# Patient Record
Sex: FEMALE | Race: WHITE
[De-identification: names, ages, dates, MRNs, and addresses within clinical notes are randomized per-mention and may not be internally consistent; named-entity substitution may affect disease eponyms.]

---

## 2017-10-04 ENCOUNTER — HOSPITAL ENCOUNTER (INPATIENT)
Dept: HOSPITAL 36 - ER | Age: 62
LOS: 3 days | Discharge: HOME | DRG: 377 | End: 2017-10-07
Payer: MEDICARE

## 2017-10-04 VITALS — DIASTOLIC BLOOD PRESSURE: 66 MMHG | SYSTOLIC BLOOD PRESSURE: 120 MMHG

## 2017-10-04 DIAGNOSIS — Z83.3: ICD-10-CM

## 2017-10-04 DIAGNOSIS — E83.51: ICD-10-CM

## 2017-10-04 DIAGNOSIS — I10: ICD-10-CM

## 2017-10-04 DIAGNOSIS — G93.40: ICD-10-CM

## 2017-10-04 DIAGNOSIS — K21.9: ICD-10-CM

## 2017-10-04 DIAGNOSIS — K62.1: ICD-10-CM

## 2017-10-04 DIAGNOSIS — E87.6: ICD-10-CM

## 2017-10-04 DIAGNOSIS — E86.0: ICD-10-CM

## 2017-10-04 DIAGNOSIS — K56.41: ICD-10-CM

## 2017-10-04 DIAGNOSIS — R13.10: ICD-10-CM

## 2017-10-04 DIAGNOSIS — K64.8: ICD-10-CM

## 2017-10-04 DIAGNOSIS — K27.9: ICD-10-CM

## 2017-10-04 DIAGNOSIS — Z86.73: ICD-10-CM

## 2017-10-04 DIAGNOSIS — F03.90: ICD-10-CM

## 2017-10-04 DIAGNOSIS — K62.5: Primary | ICD-10-CM

## 2017-10-04 DIAGNOSIS — R73.9: ICD-10-CM

## 2017-10-04 DIAGNOSIS — Z93.1: ICD-10-CM

## 2017-10-04 DIAGNOSIS — E87.1: ICD-10-CM

## 2017-10-04 DIAGNOSIS — K62.6: ICD-10-CM

## 2017-10-04 DIAGNOSIS — E78.5: ICD-10-CM

## 2017-10-04 DIAGNOSIS — Z82.49: ICD-10-CM

## 2017-10-04 DIAGNOSIS — I25.9: ICD-10-CM

## 2017-10-04 LAB
ALBUMIN/GLOB SERPL: 1.1 {RATIO} (ref 1–1.8)
ALP SERPL-CCNC: 96 U/L (ref 34–104)
ALT SERPL-CCNC: 18 U/L (ref 7–52)
AMYLASE SERPL-CCNC: 33 U/L (ref 29–103)
ANION GAP SERPL CALC-SCNC: 11.5 MMOL/L (ref 7–16)
AST SERPL-CCNC: 21 U/L (ref 13–39)
BASOPHILS NFR BLD AUTO: 0.5 % (ref 0–2)
BILIRUB SERPL-MCNC: 0.4 MG/DL (ref 0.3–1)
BUN SERPL-MCNC: 23 MG/DL (ref 7–25)
BUN/CREAT SERPL: 57.5
CALCIUM SERPL-MCNC: 9.8 MG/DL (ref 8.6–10.3)
CHLORIDE SERPL-SCNC: 104 MEQ/L (ref 98–107)
CHOLEST SERPL-MCNC: 173 MG/DL (ref ?–200)
CO2 SERPL-SCNC: 21.6 MEQ/L (ref 21–31)
CREAT SERPL-MCNC: 0.4 MG/DL (ref 0.6–1.2)
EOSINOPHIL NFR BLD AUTO: 3.2 % (ref 0–5)
ERYTHROCYTE [DISTWIDTH] IN BLOOD BY AUTOMATED COUNT: 12.7 % (ref 11.5–20)
GLOBULIN SER-MCNC: 3.7 GM/DL
GLUCOSE SERPL-MCNC: 123 MG/DL (ref 70–105)
HCT VFR BLD CALC: 43 % (ref 41–60)
HGB BLD-MCNC: 14.5 GM/DL (ref 12–16)
INR PPP: 1.06 (ref 0.5–1.4)
LIPASE SERPL-CCNC: 25 U/L (ref 11–82)
LYMPHOCYTES NFR BLD AUTO: 24.3 % (ref 20–50)
MCH RBC QN AUTO: 29.8 PG (ref 27–31)
MCHC RBC AUTO-ENTMCNC: 33.8 PG (ref 28–36)
MCV RBC AUTO: 88.4 FL (ref 81–100)
MONOCYTES NFR BLD AUTO: 7 % (ref 2–10)
NEUTROPHILS # BLD: 4.8 TH/CMM (ref 1.8–8)
NEUTROPHILS NFR BLD AUTO: 65 % (ref 40–80)
PLATELET # BLD: 355 TH/CMM (ref 150–400)
PMV BLD AUTO: 7.8 FL
POTASSIUM SERPL-SCNC: 4.1 MEQ/L (ref 3.5–5.1)
PROTHROMBIN TIME: 11 SECONDS (ref 9.5–11.5)
RBC # BLD AUTO: 4.87 MIL/CMM (ref 3.8–5.1)
SODIUM SERPL-SCNC: 133 MEQ/L (ref 136–145)
TRIGL SERPL-MCNC: 206 MG/DL (ref ?–150)
WBC # BLD AUTO: 7.3 TH/CMM (ref 4.8–10.8)

## 2017-10-04 PROCEDURE — X6024: HCPCS

## 2017-10-04 PROCEDURE — Z7610: HCPCS

## 2017-10-04 PROCEDURE — Z7506: HCPCS

## 2017-10-04 PROCEDURE — C9113 INJ PANTOPRAZOLE SODIUM, VIA: HCPCS

## 2017-10-04 PROCEDURE — X6206: HCPCS

## 2017-10-04 RX ADMIN — LACTULOSE SCH GM: 20 SOLUTION ORAL at 20:36

## 2017-10-04 RX ADMIN — POLYVINYL ALCOHOL SCH DROP: 14 SOLUTION/ DROPS OPHTHALMIC at 20:36

## 2017-10-04 NOTE — DIAGNOSTIC IMAGING REPORT
Portable chest x-ray



HISTORY: Pain



There is a very poor inspiration. The heart is enlarged. No acute focal

pulmonary processes. No evidence of pleural fluid.



IMPRESSION:

1. Cardiomegaly

2. Allowing for a poor inspiration, no acute focal pulmonary processes.

## 2017-10-04 NOTE — ED PHYSICIAN CHART
ED Chief Complaint/HPI





- Patient Information


Date Seen:: 10/04/17


Time Seen:: 11:15


Chief Complaint:: Hematochezia


History of Present Illness:: 





onset x one day of hematochezia; no report of hematemesis, melena, Abd. pain, 

flank pain, A/N/V/D/C, fever, chills, C/P, SOB, cough, neck pain, H/As, weakness

, or dizziness


Allergies:: 


 Allergies











Allergy/AdvReac Type Severity Reaction Status Date / Time


 


No Known Allergies Allergy   Verified 10/04/17 11:17











Vitals:: 


 Vital Signs - 8 hr











  10/04/17





  11:17


 


Temp 97.7 F


 


HR 70


 


RR 14


 


/69


 


O2 Sat % 98











Historian:: Patient, EMS


Review:: Nurse's Note Reviewed, EMS run form Reviewed





ED Review of Systems





- Review of Systems


General/Constitutional: Fever, Chills, No weight loss, Weakness, No diaphoresis

, No edema, No loss of appetite


Skin: No skin lesions, No rash, No bruising


Head: No headache, No light-headedness


Eyes: No loss of vision, No pain, No diplopia


ENT: No earache, No nasal drainage, No sore throat, No tinnitus


Neck: No neck pain, No swelling, No thyromegaly, No stiffness, No mass noted


Cardio Vascular: No chest pain, No palpitations, No PND, No orthopnea, No edema


Pulmonary: No SOB, No cough, No sputum, No wheezing


GI: Nausea, Vomiting, Diarrhea, No pain, No melena, Hematochezia, No 

constipation, No hematemesis


G/U: No dysuria, No frequency, No hematuria


Ob/Gyn: No vaginal discharge, No abnormal vaginal bleed, No contraction


Musculoskeletal: No bone or joint pain, No back pain, No muscle pain


Endocrine: No polyuria, No polydipsia


Psychiatric: No prior psych history, No depression, No anxiety, No suicidal 

ideation


Hematopoietic: No bruising, No lymphadenopathy


Allergic/Immuno: No urticaria, No angioedema


Neurological: No syncope, Focal symptoms, No weakness, No paresthesia, No 

headache, No seizure, No dizziness, Confusion, No vertigo





ED Past Medical History





- Past Medical History


Obtainable: Yes


Past Medical History: HTN, CVA/TIA, PUD/GERD, Dementia


Family History: Diabetes Melitus, HTN


Social History: Non Smoker, No Alcohol, No Drug Use, Single, Care Facility


Surgical History: PEG/GTube


Psychiatricy History: Dementia


Medication: Reviewed





Family Medical History





- Family Member


  ** Mother


History Unknown: Yes





ED Physical Exam





- Physical Examination


General/Constitutional: Awake, Well-developed, well-nourished, Alert, No 

distress, GCS 15, Non-toxic appearing, Ambulatory


Head: Atraumatic


Eyes: Lids, conjuctiva normal, PERRL, EOMI


Skin: Nl inspection, No rash, No skin lesions, No ecchymosis, Well hydrated, No 

lymphadenopathy


ENMT: External ears, nose nl, Nasal exam nl, Lips, teeth, gums nl


Neck: Nontender, Full ROM w/o pain, No JVD, No nuchal rigidity, No bruit, No 

mass, No stridor


Respiratory: Nl effort/Exclusion, Clear to Auscultation, No Wheeze/Rhonchi/Rales


Cardio Vascular: RRR, No murmur, gallop, rubs, NL S1 S2


GI: No tenderness/rebounding/guarding, No organomegaly, No hernia, Normal BS's, 

Nondistended, No mass/bruits, No McBurney tenderness


: No CVA tenderness


Extremities: No tenderness or effusion, Full ROM, normal strength in all 

extremities, No edema, Normal digits & nails


Neuro/Psych: Alert/oriented, DTR's symmetric, Normal sensory exam, Normal motor 

strength, Judgement/insight normal, Mood normal, Normal gait


Other Neuro/Psych comments:: 





+ old CVA; Disoriented and Confused


Misc: Normal back, No paraspinal tenderness





ED Labs/Radiology/EKG Results





- Lab Results


Comments:: 





Na+: 133





- Radiology Results


Comments:: 





NAD





- EKG Interpretations


EKG Time:: 11:52


Rate & Rhythm: 85; NSR


Comments:: 





T-Wave Inversions





ED Septic Shock





- .


Is Septic Shock (SBP<90, OR Lactate>4 mmol\L) present?: No





- <6hrs of presentation:


Vital Signs: 


 Vital Signs - 8 hr











  10/04/17





  11:17


 


Temp 97.7 F


 


HR 70


 


RR 14


 


/69


 


O2 Sat % 98














ED Reassessment (Disposition)





- Reassessment


Reassessment Condition:: Improved





- Diagnosis


Diagnosis:: 





Dx: Myocardial Ischemia; Hematochezia; GI Bleed; Hyponatremia; Dehydration; 

Rectal Bleeding





- Aftercare/Follow up Instructions


Aftercare/Follow-Up Instructions:: Counseled pt regarding lab results/diagnosis 

& need follow up, Counseled pt & family regarding lab results/diagnosis & need 

follow up





- Patient Disposition


Discharge/Transfer:: Acute Care w/in this hosp


Accepting Physician:: Dr. Kulkarni


Time Called:: 1350


Time Responded:: 13:50


Admitted to:: Telemetry


Spoke to:: Dr. Kulkarni


Admitting Medical Physician:: Dr. Kulkarni


Condition at Disposition:: Stable, Improved

## 2017-10-05 LAB
ANION GAP SERPL CALC-SCNC: 10.2 MMOL/L (ref 7–16)
BASOPHILS NFR BLD AUTO: 0 % (ref 0–2)
BUN SERPL-MCNC: 18 MG/DL (ref 7–25)
BUN/CREAT SERPL: 45
CALCIUM SERPL-MCNC: 8.8 MG/DL (ref 8.6–10.3)
CHLORIDE SERPL-SCNC: 107 MEQ/L (ref 98–107)
CO2 SERPL-SCNC: 22.5 MEQ/L (ref 21–31)
CREAT SERPL-MCNC: 0.4 MG/DL (ref 0.6–1.2)
EOSINOPHIL NFR BLD AUTO: 2.5 % (ref 0–5)
ERYTHROCYTE [DISTWIDTH] IN BLOOD BY AUTOMATED COUNT: 12.7 % (ref 11.5–20)
GLUCOSE SERPL-MCNC: 107 MG/DL (ref 70–105)
HCT VFR BLD CALC: 38 % (ref 41–60)
HGB BLD-MCNC: 12.9 GM/DL (ref 12–16)
LYMPHOCYTES NFR BLD AUTO: 29.3 % (ref 20–50)
MCH RBC QN AUTO: 30 PG (ref 27–31)
MCHC RBC AUTO-ENTMCNC: 33.9 PG (ref 28–36)
MCV RBC AUTO: 88.6 FL (ref 81–100)
MONOCYTES NFR BLD AUTO: 7.8 % (ref 2–10)
NEUTROPHILS # BLD: 3.6 TH/CMM (ref 1.8–8)
NEUTROPHILS NFR BLD AUTO: 60.4 % (ref 40–80)
PLATELET # BLD: 337 TH/CMM (ref 150–400)
PMV BLD AUTO: 8.1 FL
POTASSIUM SERPL-SCNC: 3.7 MEQ/L (ref 3.5–5.1)
RBC # BLD AUTO: 4.29 MIL/CMM (ref 3.8–5.1)
SODIUM SERPL-SCNC: 136 MEQ/L (ref 136–145)
WBC # BLD AUTO: 5.9 TH/CMM (ref 4.8–10.8)

## 2017-10-05 RX ADMIN — POLYVINYL ALCOHOL SCH DROP: 14 SOLUTION/ DROPS OPHTHALMIC at 20:22

## 2017-10-05 RX ADMIN — SODIUM PHOSPHATE, DIBASIC AND SODIUM PHOSPHATE, MONOBASIC PRN ML: 7; 19 ENEMA RECTAL at 03:20

## 2017-10-05 RX ADMIN — POLYVINYL ALCOHOL SCH DROP: 14 SOLUTION/ DROPS OPHTHALMIC at 09:00

## 2017-10-05 RX ADMIN — LACTULOSE SCH GM: 20 SOLUTION ORAL at 20:23

## 2017-10-05 RX ADMIN — POLYVINYL ALCOHOL SCH DROP: 14 SOLUTION/ DROPS OPHTHALMIC at 17:24

## 2017-10-05 RX ADMIN — SODIUM PHOSPHATE, DIBASIC AND SODIUM PHOSPHATE, MONOBASIC PRN ML: 7; 19 ENEMA RECTAL at 03:19

## 2017-10-05 RX ADMIN — LACTULOSE SCH GM: 20 SOLUTION ORAL at 09:00

## 2017-10-05 RX ADMIN — POLYVINYL ALCOHOL SCH DROP: 14 SOLUTION/ DROPS OPHTHALMIC at 14:00

## 2017-10-05 NOTE — HISTORY & PHYSICAL
ADMIT DATE:  10/04/2017



CHIEF COMPLAINT:  Hematochezia.



HISTORY OF PRESENT ILLNESS:  The patient is a 62-year-old female with reports of

1 day of hematochezia, ____ hematemesis, melena, abdominal pain, flank pain or

other complaints.



ALLERGIES:  No known allergies.



REVIEW OF SYSTEMS:  See history of present illness.



PAST MEDICAL HISTORY:  Hypertension, CVA/TIA, peptic ulcer/GERD, dementia.



FAMILY HISTORY:  Diabetes and hypertension.



SOCIAL HISTORY:  No reports of smoking, drinking or drug use.  The patient is a

resident of a skilled nursing facility.



PAST SURGICAL HISTORY:  Gastrostomy tube.



PSYCHIATRIC HISTORY:  Dementia.



MEDICATIONS:  See medication form.



PHYSICAL EXAMINATION:

GENERAL:  The patient is awake, alert, nontoxic in appearance.

VITAL SIGNS:  On admission, temperature 98.7, pulse of 70, blood pressure

140/69, respirations 14, oxygen saturation 90%.

HEENT:  Normocephalic, atraumatic.  Extraocular movements intact.  Pupils

reactive to light.  Oropharynx is clear.

NECK:  Supple.  No thyromegaly.

CARDIOVASCULAR:  S1, S2.  No rubs, gallops.

RESPIRATORY:  Clear.  No wheezes or rhonchi.

GASTROINTESTINAL:  Soft, nontender, nondistended, bowel sounds.

GENITOURINARY:  No CVA tenderness.

BACK:  No midline tenderness.

NEUROLOGIC:  Cranial nerves 2-12 intact.  Sensation intact.  Neurovascular is

intact.  Normal gait.

PSYCHIATRIC:  The patient is slightly confused.



LABORATORY DATA:  Hematology; WBC 7.3, hemoglobin 14.5, hematocrit 43.0,

platelet count 355.  PT 11.0, INR 1.06.  Chemistry:  Sodium 133, potassium 4.1,

chloride 104, bicarbonate 21, anion gap 11, BUN 23, creatinine 0.4, GFR more

than 60, glucose 123, calcium 9.8, total bilirubin 0.4, AST 21, ALT 18, and alk

phos 96, creatinine kinase 42.  Troponin less than 0.01, BNP 7.9, total protein

7.9, albumin 4.2, globulin 3.7, triglycerides 206, cholesterol 173, LDL is 148,

HDL 30, amylase is 33, lipase is 25.



MICROBIOLOGY:  10/04/2017, pending.



RADIOLOGY:  Chest x-ray shows cardiomegaly.  No acute focal pulmonary process. 

Abdomen/pelvis CT shows marked distal fecal impaction with massive distention of

the sigmoid colon and primarily large bowel approximately secondary to patient's

distal fecal impaction, mild distended gallbladder 0.5 mm, hepatic cysts,

appendix not visualized, PEG tube in place, atherosclerotic vascular disease,

chronic moderate compression deformity of L1.  EKG shows normal sinus with

T-wave inversions.



IMPRESSION:

1.  Rectal bleed.

2.  Hematochezia.

3.  Rule out acute coronary syndrome.

4.  Gastrointestinal bleed.

5.  Hyponatremia.

6.  Hyperglycemia.

7.  Hypertriglyceridemia.

8.  Hypertension.

9.  History of cerebrovascular accident/transient ischemic attack.

10.  History of peptic ulcer disease/gastroesophageal reflux disease.

11.  Dementia.

12.  Dysphagia.

13.  Status post gastrostomy tube.



PLAN:  The patient admitted to telemetry unit at [facility]. 

Due for labs as needed.





DD: 10/05/2017 17:57

DT: 10/05/2017 18:47

JOB# [id number]  [id number]

## 2017-10-05 NOTE — CONSULTATION
DATE OF CONSULTATION:  10/05/2017



REQUESTING PHYSICIAN:  Dr. Michele Kulkarni.



REASON FOR CONSULTATION:  Rectal bleeding.



HISTORY OF PRESENT ILLNESS:  This is a 62-year-old female with encephalopathy

and dysphagia status post G-tube insertion, admitted from nursing home for

rectal bleeding.  On imaging here, she was noted to have fecal impaction.  Her

G-tube feedings were restarted.



PAST MEDICAL HISTORY:  As above.  It is unknown whether the patient has had a

previous colonoscopy.



MEDICATIONS:  Here are artificial tears, lactulose, and IV fluids.  Also, the

patient received pantoprazole in the ER and Fleet enema.



ALLERGIES:  None.



SOCIAL HISTORY:  No recent tobacco, alcohol, or drugs.  Nursing home resident.



FAMILY HISTORY:  Noncontributory.



REVIEW OF SYSTEMS:  A comprehensive 12-point review of systems conducted and is

only positive for those signs and symptoms present in history of present

illness.



PHYSICAL EXAMINATION:

VITAL SIGNS:  Temperature 98.4, blood pressure 124/67, pulse of 89, respirations

12, and O2 sat is 97%.

GENERAL:  The patient is a well-developed, chronically ill-appearing female in

no acute distress, nonverbal.

HEENT:  Sclerae nonicteric.  Oropharynx is clear.

CARDIOVASCULAR:  Regular rate and rhythm.

LUNGS:  With occasional rhonchi at the bases.

ABDOMEN:  Soft, slightly distended, and mild tenderness to palpation.  Intact

G-tube.

EXTREMITIES:  No edema.



LABORATORY DATA AND IMAGING:  Hemoglobin is 12.9, WBC count is 5.9, and platelet

count is 337,000.  INR is normal.  Creatinine is normal.  Liver enzymes are

normal.  Amylase and lipase are normal.  CT of the abdomen and pelvis done

without contrast in the ER showed fecal impaction in the distal rectum with

massive distention of the sigmoid colon and large bowel loops, mildly distended

gallbladder with possible focus representing either a stone or polyp, hepatic

cyst, G-tube intact.



IMPRESSION:

1.  Fecal impaction.

2.  Rectal bleeding, likely from combination of rectal ulcer and/or hemorrhoids

and/or proctitis.

3.  Dysphagia with G-tube insertion.

4.  Encephalopathy.



RECOMMENDATIONS:

1.  Bowel preparation will be given per G-tube and per rectum.

2.  Hold tube feedings for today.

3.  Monitor hemoglobin, transfuse as necessary.

4.  May need colonoscopy eventually once the patient has been cleared out.  This

will take place if procedure has not been done recently.



Thank you, Dr. Michele Kulkarni for involving us in the care of this patient.  If

you have any further questions, please call us.





DD: 10/05/2017 10:16

DT: 10/05/2017 20:59

JOB# [id number]  [id number]

## 2017-10-05 NOTE — DIAGNOSTIC IMAGING REPORT
CT abdomen and pelvis with intravenous contrast



Indication: Abdominal distention



Comparison: None,



Technique: Axial images were obtained from the lung bases to the

bilateral proximal femurs with IV contrast.  Coronal reconstructions

were made.  total DLP: 940,  CTDI17



FINDINGS:



Hypoventilatory atelectatic lung changes are seen with right basal

passive atelectatic changes. Exam is limited due to motion. Multiple

hepatic cysts are noted the largest along the posterior right lobe

measuring 7 x 6 cm. Mildly distended gallbladder is noted. 5 mm

indeterminate focus is seen along the fundus of the gallbladder. No

focal splenic or pancreatic lesions. No focal adrenal lesions. No

evidence of hydronephrosis or focal renal lesions. There is severe

distal fecal impaction with massive distention of the sigmoid colon.

Additional generalized distended loops of primarily large bowel are

noted. There is trace fluid in the right lower quadrant. A gastrostomy

feeding tube is noted. Appendix is not -visualized. 



Moderate atherosclerotic vascular disease is noted. Degenerative changes

of the spine are noted with mild scoliosis. There is a chronic moderate

compression deformity of L1.



IMPRESSION:



Marked distal fecal impaction with massive distention of the sigmoid

colon and primarily large bowel loops proximally secondary to patient's

distal fecal impaction.



Mild distended gallbladder. 5mm focus is seen along the fundus of the

gallbladder, indeterminate, and may represent a gallstone or gallbladder

wall polyp. Recommend short-term follow-up with ultrasound.



Hepatic cysts.



Trace free fluid suspected in the right lower quadrant. 



Appendix was not visualized.



Percutaneous gastrostomy feeding tube



Atherosclerotic vascular disease



Chronic moderate compression deformity of L1.

## 2017-10-06 LAB
ANION GAP SERPL CALC-SCNC: 7.9 MMOL/L (ref 7–16)
BUN SERPL-MCNC: 10 MG/DL (ref 7–25)
BUN/CREAT SERPL: 33.3
CALCIUM SERPL-MCNC: 8.1 MG/DL (ref 8.6–10.3)
CHLORIDE SERPL-SCNC: 107 MEQ/L (ref 98–107)
CO2 SERPL-SCNC: 24.3 MEQ/L (ref 21–31)
CREAT SERPL-MCNC: 0.3 MG/DL (ref 0.6–1.2)
EOSINOPHIL NFR BLD: 4 % (ref 0–5)
ERYTHROCYTE [DISTWIDTH] IN BLOOD BY AUTOMATED COUNT: 12.8 % (ref 11.5–20)
GLUCOSE SERPL-MCNC: 91 MG/DL (ref 70–105)
HCT VFR BLD CALC: 36.2 % (ref 41–60)
HGB BLD-MCNC: 12.2 GM/DL (ref 12–16)
MCH RBC QN AUTO: 30 PG (ref 27–31)
MCHC RBC AUTO-ENTMCNC: 33.7 PG (ref 28–36)
MCV RBC AUTO: 89 FL (ref 81–100)
NEUTROPHILS NFR BLD AUTO: 54 % (ref 40–80)
NEUTS BAND NFR BLD: 1 % (ref 0–10)
NRBC BLD AUTO-RTO: 2 % (ref 0–0)
PLATELET # BLD EST: ADEQUATE 10*3/UL
PMV BLD AUTO: 8.5 FL
POTASSIUM SERPL-SCNC: 3.2 MEQ/L (ref 3.5–5.1)
RBC # BLD AUTO: 4.07 MIL/CMM (ref 3.8–5.1)
SODIUM SERPL-SCNC: 136 MEQ/L (ref 136–145)
TOTAL CELLS COUNTED BLD: 100
WBC # BLD AUTO: 5.5 TH/CMM (ref 4.8–10.8)

## 2017-10-06 PROCEDURE — 0DBP8ZX EXCISION OF RECTUM, VIA NATURAL OR ARTIFICIAL OPENING ENDOSCOPIC, DIAGNOSTIC: ICD-10-PCS

## 2017-10-06 PROCEDURE — 0DH64UZ INSERTION OF FEEDING DEVICE INTO STOMACH, PERCUTANEOUS ENDOSCOPIC APPROACH: ICD-10-PCS

## 2017-10-06 RX ADMIN — POLYVINYL ALCOHOL SCH DROP: 14 SOLUTION/ DROPS OPHTHALMIC at 17:07

## 2017-10-06 RX ADMIN — POLYVINYL ALCOHOL SCH DROP: 14 SOLUTION/ DROPS OPHTHALMIC at 20:55

## 2017-10-06 RX ADMIN — LACTULOSE SCH GM: 20 SOLUTION ORAL at 20:54

## 2017-10-06 RX ADMIN — POLYVINYL ALCOHOL SCH DROP: 14 SOLUTION/ DROPS OPHTHALMIC at 09:14

## 2017-10-06 RX ADMIN — POLYVINYL ALCOHOL SCH DROP: 14 SOLUTION/ DROPS OPHTHALMIC at 12:03

## 2017-10-06 RX ADMIN — LACTULOSE SCH: 20 SOLUTION ORAL at 09:10

## 2017-10-06 NOTE — CONSULTATION
DATE OF CONSULTATION:  10/06/2017



REFERRING PHYSICIAN:  Dr. Kulkarni.



REASON FOR CONSULTATION:  Rectal bleeding.



Thank you for referring this patient to me.



HISTORY OF PRESENT ILLNESS:  This is a 62-year-old female who came in to ER

because of rectal bleeding.  No hematemesis.



PAST MEDICAL HISTORY:  Includes hypertension, CVA, TIA, peptic ulcer, GERD,

dementia, and diabetes.



On admission, the CT scan showed severe fecal impaction with markedly distended

colon.



LABORATORY STUDIES:  CBC is normal.  Chemistry likewise.



PHYSICAL EXAMINATION:

GENERAL:  The patient has a G-tube in place.

ABDOMEN:  Distended.  There is a scar in the midline abdomen.  The surgery of

which we do not know at this point.  She has minimal tenderness.  No mass is

palpated.



GI evaluation is in place and colonoscopy has been scheduled.



We will follow with you.





DD: 10/06/2017 09:40

DT: 10/06/2017 12:25

JOB# [id number]  [id number]

## 2017-10-06 NOTE — OPERATIVE REPORT
DATE OF SURGERY:     10/06/2017



PROCEDURE:  Colonoscopy with biopsy.



PREOPERATIVE DIAGNOSIS:  Rectal bleeding and fecal impaction.



POSTPROCEDURE DIAGNOSES:

1.  Long dilated colon with suboptimal bowel preparation; exam up to cecum.

2.  An 8 mm sessile distal rectal polyp, possibly overlying ulcerations, status

post biopsy.

3.  Small hemorrhoids.



INDICATION:  A 62-year-old, mentally challenged female, with dysphagia and

G-tube insertion and admitted for fecal impaction and rectal bleeding.



CONSENT:  Informed consent was obtained from the patient's family prior to

procedure after explaining risks, benefits, and alternatives including but not

limited to infection, bleeding, perforation and death.



SEDATION:  2 mg of Versed and 50 mg IV Demerol.



DESCRIPTION OF PROCEDURE AND FINDINGS:  The procedure took place as an inpatient

in the GI suite of [facility].  The patient was kept in the

left lateral decubitus position.  Adequate sedation was achieved with the above

medications.



Rectal examination revealed normal sphincter tone with no rectal masses.  The

scope was then advanced all the way to the cecum, which was visualized by

landmarks of ileocecal valves.  The bowel preparation was suboptimal and only

the ileocecal was able to be seen.  The appendiceal orifice was not seen

properly.  Given suboptimal bowel preparation, lesion less than. 1 cm may have

been missed.  The scope was then slowly withdrawn and underlying colonic mucosa

examined in detail.  The proximal scope withdrawal time from cecum to anus was

about 8 minutes.



In the distal rectum, there was an 8 mm distal rectal polyp, perhaps overlying

an ulceration and the rectum was somewhat limited due to the presence of the

liquid stool here.  Small internal hemorrhoids were noted on end view of the 
anal

verge.  The scope was withdrawn.  The patient tolerated the procedure well, no

complications anticipated.



RECOMMENDATIONS:

1.  Follow up biopsy results.

2.  Resume tube feedings.

3.  Monitor hemoglobin, transfuse as necessary.

4.  Laxatives to avoid fecal impaction in the future.



Thank you, Dr. Michele Kulkarni for involving us in the care of your patient.  If

you have any further questions, please call us to help.





DD: 10/06/2017 14:10

DT: 10/06/2017 15:48

JOB# [id number]  [id number]

DARIA

## 2017-10-06 NOTE — GENERAL PROGRESS NOTE
Subjective





- Review of Systems


Service Date: 10/06/17


Events since last encounter: 





CT fecal impaction


colonoscopy today





Objective





- Results


Result Diagrams: 


 10/06/17 05:45





 10/06/17 05:45


Recent Labs: 


 Laboratory Last Values











WBC  5.5 Th/cmm (4.8-10.8)   10/06/17  05:45    


 


RBC  4.07 Mil/cmm (3.80-5.10)   10/06/17  05:45    


 


Hgb  12.2 gm/dL (12-16)   10/06/17  05:45    


 


Hct  36.2 % (41.0-60)  L  10/06/17  05:45    


 


MCV  89.0 fl ()   10/06/17  05:45    


 


MCH  30.0 pg (27.0-31.0)   10/06/17  05:45    


 


MCHC Differential  33.7 pg (28.0-36.0)   10/06/17  05:45    


 


RDW  12.8 % (11.5-20.0)   10/06/17  05:45    


 


Plt Count  337 Th/cmm (150-400)   10/05/17  05:50    


 


MPV  8.5 fl  10/06/17  05:45    


 


Neutrophils %  60.4 % (40.0-80.0)   10/05/17  05:50    


 


Band Neutrophils %  1 % (0-10)   10/06/17  05:45    


 


Lymphocytes %  29.3 % (20.0-50.0)   10/05/17  05:50    


 


Monocytes %  7.8 % (2.0-10.0)   10/05/17  05:50    


 


Eosinophils %  2.5 % (0.0-5.0)   10/05/17  05:50    


 


Basophils %  0.0 % (0.0-2.0)   10/05/17  05:50    


 


Neutrophils (Manual)  54 % (40-80)   10/06/17  05:45    


 


Lymphocytes  40 % (20-50)   10/06/17  05:45    


 


Monocytes  1 % (2-10)  L  10/06/17  05:45    


 


Eosinophils  4 % (0-5)   10/06/17  05:45    


 


Nucleated RBCs  2.0 % (0-0)  H  10/06/17  05:45    


 


Platelet Estimate  ADEQUATE  (NORMAL)   10/06/17  05:45    


 


PT  11.0 SECONDS (9.5-11.5)   10/04/17  11:35    


 


INR  1.06  (0.5-1.4)   10/04/17  11:35    


 


Sodium  136 mEq/L (136-145)   10/06/17  05:45    


 


Potassium  3.2 mEq/L (3.5-5.1)  L  10/06/17  05:45    


 


Chloride  107 mEq/L ()   10/06/17  05:45    


 


Carbon Dioxide  24.3 mEq/L (21.0-31.0)   10/06/17  05:45    


 


Anion Gap  7.9  (7.0-16.0)   10/06/17  05:45    


 


BUN  10 mg/dL (7-25)   10/06/17  05:45    


 


Creatinine  0.3 mg/dL (0.6-1.2)  L  10/06/17  05:45    


 


Est GFR ( Amer)  > 60.0 ml/min (>90)   10/06/17  05:45    


 


Est GFR (Non-Af Amer)  > 60.0 ml/min  10/06/17  05:45    


 


BUN/Creatinine Ratio  33.3   10/06/17  05:45    


 


Glucose  91 mg/dL ()   10/06/17  05:45    


 


POC Glucose  94 MG/DL (70 - 105)   10/06/17  05:54    


 


Calcium  8.1 mg/dL (8.6-10.3)  L  10/06/17  05:45    


 


Total Bilirubin  0.4 mg/dL (0.3-1.0)   10/04/17  11:35    


 


AST  21 U/L (13-39)   10/04/17  11:35    


 


ALT  18 U/L (7-52)   10/04/17  11:35    


 


Alkaline Phosphatase  96 U/L ()   10/04/17  11:35    


 


Ammonia  49 umol/L (16-53)   10/06/17  05:45    


 


Creatine Kinase  42 U/L ()   10/04/17  11:35    


 


Troponin I  < 0.01 ng/mL (0.01-0.05)  L  10/04/17  11:35    


 


B-Natriuretic Peptide  7.9 pg/mL (5.0-100.0)   10/04/17  11:35    


 


Total Protein  7.9 gm/dL (6.0-8.3)   10/04/17  11:35    


 


Albumin  4.2 gm/dL (3.7-5.3)   10/04/17  11:35    


 


Globulin  3.7 gm/dL  10/04/17  11:35    


 


Albumin/Globulin Ratio  1.1  (1.0-1.8)   10/04/17  11:35    


 


Triglycerides  206 mg/dL (<150)  H  10/04/17  11:35    


 


Cholesterol  173 mg/dL (<200)   10/04/17  11:35    


 


LDL Cholesterol Direct  148 mg/dL ()   10/04/17  11:35    


 


HDL Cholesterol  30 mg/dL (23-92)   10/04/17  11:35    


 


Amylase  33 U/L ()   10/04/17  11:35    


 


Lipase  25 U/L (11-82)   10/04/17  11:35    














- Physical Exam


Vitals and I&O: 


 Vital Signs











Temp  98.0 F   10/06/17 04:00


 


Pulse  61   10/06/17 04:00


 


Resp  18   10/06/17 04:00


 


BP  117/71   10/06/17 04:00


 


Pulse Ox  95   10/06/17 04:00








 Intake & Output











 10/05/17 10/06/17 10/06/17





 18:59 06:59 18:59


 


Intake Total  4928.333 


 


Output Total 1  


 


Balance -1 4928.333 


 


Weight (lbs) 80.739 kg 79.832 kg 


 


Intake:   


 


  Intake, IV Amount  928.333 


 


    Sodium Chloride 0.9% 1,  928.333 





    000 ml @ 100 mls/hr IV .   





    Q10H [facility] Rx#:[id number]   


 


  Other  4000 


 


Output:   


 


  Stool 1  


 


Other:   


 


  # Voids 3 3 


 


  # Bowel Movements  3 


 


  Stool Characteristics Soft  





 Brown  











Active Medications: 


Current Medications





Artificial Tears (Artificial Tears Ophth Soln)  1 drop EACH EYE QID RADHA


   Stop: 12/03/17 20:59


   Last Admin: 10/06/17 09:14 Dose:  1 drop


Sodium Chloride (Nacl 0.9%)  1,000 mls @ 100 mls/hr IV .Q10H RADHA


   Stop: 12/03/17 17:55


   Last Admin: 10/06/17 02:42 Dose:  100 mls/hr


Lactulose (Cephulac)  20 gm PO Q12HR RADHA


   Stop: 12/03/17 20:59


   Last Admin: 10/06/17 09:10 Dose:  Not Given


Ondansetron HCl (Zofran)  4 mg IV Q4H PRN


   PRN Reason: Nausea / Vomiting


   Stop: 12/04/17 10:30


   Last Admin: 10/05/17 21:35 Dose:  4 mg

## 2017-10-06 NOTE — PROGRESS NOTES
DATE:  10/05/2017



SUBJECTIVE:  The patient is asleep.  The patient is on IV fluids.



OBJECTIVE

VITAL SIGNS:  Temperature 98.4, pulse 89, blood pressure 124/67, O2 sat is 97%

on room air.

CARDIOVASCULAR:  S1 and S2 is clear.

GASTROINTESTINAL:  Soft, distended, nontender.  Positive bowel sounds.



LABORATORY DATA:  Hematology; WBC 5.9, hemoglobin 12.9, hematocrit 43.0.

Platelet count of 337.  Chemistry:  Sodium 137, potassium 3.7, chloride 104,

bicarbonate 22, anion gap 10, BUN 18, creatinine 0.4.  GFR is 160, glucose is

107, calcium 8.8.  Microbiology:  MRSA screen from 10/04/2017 negative. 

Radiology:  No new results.



ASSESSMENT:

1.  Fecal impaction.

2.  Rectal bleed.

3.  Encephalopathy.

4.  Hyponatremia, resolved.

5.  Hyperglycemia.

6.  Hypertriglyceridemia.

7.  Dyslipidemia.

8.  Hypertension.

9.  Cerebrovascular accident/transient ischemic attack.

10.  Peptic ulcer disease/gastroesophageal reflux disease.

11.  Dementia.

12.  Dysphagia.

13.  Status post gastrostomy tube.



PLAN:

1.  Continue current medication.

2.  Tube feedings on hold.

3.  The patient may need colonoscopy.



If you have any further questions, please call us.





DD: 10/05/2017 18:00

DT: 10/06/2017 05:19

JOB# [id number]  [id number]

## 2017-10-07 LAB
INR PPP: 1.11 (ref 0.5–1.4)
PROTHROMBIN TIME: 11.6 SECONDS (ref 9.5–11.5)

## 2017-10-07 RX ADMIN — POLYVINYL ALCOHOL SCH DROP: 14 SOLUTION/ DROPS OPHTHALMIC at 09:24

## 2017-10-07 RX ADMIN — POLYVINYL ALCOHOL SCH DROP: 14 SOLUTION/ DROPS OPHTHALMIC at 13:34

## 2017-10-07 RX ADMIN — LACTULOSE SCH GM: 20 SOLUTION ORAL at 09:24

## 2017-10-07 NOTE — DISCHARGE SUMMARY
DATE OF DISCHARGE:  10/07/2017



DISCHARGE DIAGNOSES:

1.  Status post colonoscopy with biopsy.

2.  Rectal polyp.

3.  Small hemorrhoids.

4.  Rectal bleeding, resolved.

5.  Fecal impaction, resolved.

6.  Encephalopathy.

7.  Dyslipidemia.

8.  Hypertension.

9.  Cerebrovascular accident/transient ischemic attack.

10.  History of peptic ulcer disease/Gastroesophageal reflux disease.

11.  ____.

12.  Status post gastrostomy tube.



HOSPITAL COURSE:  The patient is a 62-year-old female transferred from a skilled

facility.  The patient was admitted with a diagnoses of rectal bleed,

hematochezia, GI bleed, hyponatremia, hyperglycemia,

hypertriglyceridemia/dyslipidemia, hypertension, history of cerebrovascular

accident/transient ischemic attack, history of peptic ulcer

disease/gastroesophageal reflux disease, status post gastrostomy tube.



The patient was admitted to telemetry unit.  Consultation obtained.  GI consult

Dr. Anshu Hill and associates.  Cardiovascular consultation Dr. Mcnulty.  Per

recommendations from GI, the patient will require a colonoscopy.  The patient

underwent colonoscopy on 10/06/2017.  Results of the colonoscopy showed long

dilated colon with suboptimal preparation, exam to cecum, an 8 mm sessile distal

rectal polyp, and small hemorrhoids.



HOSPITAL COURSE:  The patient has no further episodes of rectal bleed.  The

patient's labs have been stable.  The patient will be discharged back to skilled

nursing facility.





DD: 10/07/2017 12:13

DT: 10/07/2017 19:24

[facility]# [id number]  [id number]

## 2017-10-07 NOTE — GENERAL PROGRESS NOTE
Subjective





- Review of Systems


Service Date: 10/07/17


Events since last encounter: 





minimal KENYATTA drainage


check PT


await Cardiology prescription for anticoagulant





Objective





- Results


Result Diagrams: 


 10/06/17 05:45





 10/06/17 05:45


Recent Labs: 


 Laboratory Last Values











WBC  5.5 Th/cmm (4.8-10.8)   10/06/17  05:45    


 


RBC  4.07 Mil/cmm (3.80-5.10)   10/06/17  05:45    


 


Hgb  12.2 gm/dL (12-16)   10/06/17  05:45    


 


Hct  36.2 % (41.0-60)  L  10/06/17  05:45    


 


MCV  89.0 fl ()   10/06/17  05:45    


 


MCH  30.0 pg (27.0-31.0)   10/06/17  05:45    


 


MCHC Differential  33.7 pg (28.0-36.0)   10/06/17  05:45    


 


RDW  12.8 % (11.5-20.0)   10/06/17  05:45    


 


Plt Count  337 Th/cmm (150-400)   10/05/17  05:50    


 


MPV  8.5 fl  10/06/17  05:45    


 


Neutrophils %  60.4 % (40.0-80.0)   10/05/17  05:50    


 


Band Neutrophils %  1 % (0-10)   10/06/17  05:45    


 


Lymphocytes %  29.3 % (20.0-50.0)   10/05/17  05:50    


 


Monocytes %  7.8 % (2.0-10.0)   10/05/17  05:50    


 


Eosinophils %  2.5 % (0.0-5.0)   10/05/17  05:50    


 


Basophils %  0.0 % (0.0-2.0)   10/05/17  05:50    


 


Neutrophils (Manual)  54 % (40-80)   10/06/17  05:45    


 


Lymphocytes  40 % (20-50)   10/06/17  05:45    


 


Monocytes  1 % (2-10)  L  10/06/17  05:45    


 


Eosinophils  4 % (0-5)   10/06/17  05:45    


 


Nucleated RBCs  2.0 % (0-0)  H  10/06/17  05:45    


 


Platelet Estimate  ADEQUATE  (NORMAL)   10/06/17  05:45    


 


PT  11.0 SECONDS (9.5-11.5)   10/04/17  11:35    


 


INR  1.06  (0.5-1.4)   10/04/17  11:35    


 


Sodium  136 mEq/L (136-145)   10/06/17  05:45    


 


Potassium  3.2 mEq/L (3.5-5.1)  L  10/06/17  05:45    


 


Chloride  107 mEq/L ()   10/06/17  05:45    


 


Carbon Dioxide  24.3 mEq/L (21.0-31.0)   10/06/17  05:45    


 


Anion Gap  7.9  (7.0-16.0)   10/06/17  05:45    


 


BUN  10 mg/dL (7-25)   10/06/17  05:45    


 


Creatinine  0.3 mg/dL (0.6-1.2)  L  10/06/17  05:45    


 


Est GFR ( Amer)  > 60.0 ml/min (>90)   10/06/17  05:45    


 


Est GFR (Non-Af Amer)  > 60.0 ml/min  10/06/17  05:45    


 


BUN/Creatinine Ratio  33.3   10/06/17  05:45    


 


Glucose  91 mg/dL ()   10/06/17  05:45    


 


POC Glucose  94 MG/DL (70 - 105)   10/06/17  05:54    


 


Calcium  8.1 mg/dL (8.6-10.3)  L  10/06/17  05:45    


 


Total Bilirubin  0.4 mg/dL (0.3-1.0)   10/04/17  11:35    


 


AST  21 U/L (13-39)   10/04/17  11:35    


 


ALT  18 U/L (7-52)   10/04/17  11:35    


 


Alkaline Phosphatase  96 U/L ()   10/04/17  11:35    


 


Ammonia  49 umol/L (16-53)   10/06/17  05:45    


 


Creatine Kinase  42 U/L ()   10/04/17  11:35    


 


Troponin I  < 0.01 ng/mL (0.01-0.05)  L  10/04/17  11:35    


 


B-Natriuretic Peptide  7.9 pg/mL (5.0-100.0)   10/04/17  11:35    


 


Total Protein  7.9 gm/dL (6.0-8.3)   10/04/17  11:35    


 


Albumin  4.2 gm/dL (3.7-5.3)   10/04/17  11:35    


 


Globulin  3.7 gm/dL  10/04/17  11:35    


 


Albumin/Globulin Ratio  1.1  (1.0-1.8)   10/04/17  11:35    


 


Triglycerides  206 mg/dL (<150)  H  10/04/17  11:35    


 


Cholesterol  173 mg/dL (<200)   10/04/17  11:35    


 


LDL Cholesterol Direct  148 mg/dL ()   10/04/17  11:35    


 


HDL Cholesterol  30 mg/dL (23-92)   10/04/17  11:35    


 


Amylase  33 U/L ()   10/04/17  11:35    


 


Lipase  25 U/L (11-82)   10/04/17  11:35    














- Physical Exam


Vitals and I&O: 


 Vital Signs











Temp  97.1 F   10/07/17 04:00


 


Pulse  64   10/07/17 04:00


 


Resp  19   10/07/17 04:00


 


BP  111/49   10/07/17 04:00


 


Pulse Ox  98   10/07/17 04:00








 Intake & Output











 10/06/17 10/07/17 10/07/17





 18:59 06:59 18:59


 


Intake Total 1180  


 


Balance 1180  


 


Weight (lbs) 79.832 kg 82.418 kg 


 


Intake:   


 


  Intake, IV Amount 1000  


 


    Sodium Chloride 0.9% 1, 1000  





    000 ml @ 100 mls/hr IV .   





    Q10H formerly [facility] Rx#:[id number]   


 


  Oral 0  


 


  Tube Feeding 180  


 


Other:   


 


  # Voids 4 4 


 


  # Bowel Movements 3  











Active Medications: 


Current Medications





Artificial Tears (Artificial Tears Ophth Soln)  1 drop EACH EYE QID RADHA


   Stop: 12/03/17 20:59


   Last Admin: 10/07/17 09:24 Dose:  1 drop


Sodium Chloride (Nacl 0.9%)  1,000 mls @ 100 mls/hr IV .Q10H RADHA


   Stop: 12/03/17 17:55


   Last Admin: 10/06/17 21:17 Dose:  100 mls/hr


Lactulose (Cephulac)  20 gm PO Q12HR RADHA


   Stop: 12/03/17 20:59


   Last Admin: 10/07/17 09:24 Dose:  20 gm


Ondansetron HCl (Zofran)  4 mg IV Q4H PRN


   PRN Reason: Nausea / Vomiting


   Stop: 12/04/17 10:30


   Last Admin: 10/05/17 21:35 Dose:  4 mg











Nutritional Asmnt/Malnutr-PDOC





- Dietary Evaluation


Malnutrition Findings (Please click <Entered> for more info): 








Nutritional Asmnt/Malnutrition                             Start:  10/06/17 13:

02


Text:                                                      Status: Complete    

  


Freq:                                                                          

  


 Document     10/06/17 13:02  GSUN  (Rec: 10/06/17 13:15  GSUN  [id number])


 Nutritional Asmnt/Malnutrition


     Patient General Information


      Nutritional Screening                      High Risk Screening


      Diagnosis                                  Rectal bleed, hematochezia, GI


                                                 bleed, r/p acute coronary


                                                 syndrome


      Pertinent Medical Hx/Surgical Hx           HTN, CVA/TIA, peptic ulcer/


                                                 GERD, dementia, PEG


      Subjective Information                     62 year old female. 10/4 CT:


                                                 fecal impaction, massive


                                                 distention of sigmoid colon,


                                                 large bowel loops. 10/5


                                                 morning, gtube held, NPO, due


                                                 to abdomen distention. Pt


                                                 prepped for colonscopy today,


                                                 7 BM since adm. Spoke to JULIO CÉSAR Francis, confimed above. Pt


                                                 appeared overweight, no muscle


                                                 fat wasting noted.


      Current Diet Order/ Nutrition Support      NPO


      Pertinent Medications                      Cephulac, Zofran


      Pertinent Labs                             Reviewed.


     Nutritional Hx/Data


      Height                                     1.6 m


      Height (Calculated Centimeters)            160.0


      Current Weight (lbs)                       79.832 kg


      Weight (Calculated Kilograms)              79.8


      Weight (Calculated Grams)                  47875.3


      Ideal Body Weight                          115


      Weight Status                              Obese


     GI Symptoms


      Skin Integrity/Comment:                    Juan 13.


     Estimated Nutritional Goals


      BEE in Kcals:                              Adj wt of IBW


      Calories/Kcals/Kg                          AdjWBW 130.3lb/59.2kg


      Kcals Calculated                           1480-1776kcal (25-30kcal/kg)


      Protein:                                   Adj wt of IBW


      Protein Calculated                         59g (1g/kg)


      Fluid: ml                                  1480-1776ml (1ml/kcal)


     Nutritional Problem


      1. Problem


       Problem                                   Altered GI function related to


       Etiology                                  unknwon aeb


       Signs/Symptoms:                           dx rectal bleed, hematochezia,


                                                 GI bleed, pending colonscopy


     Intervention/Recommendation


      Comments                                   1. When appropriate to resume


                                                 tube feeding, recommend


                                                 Fiebrsource at 55ml/hr x 24hrs


                                                 , providing 1320ml total


                                                 volume, 1584kcal, 71g protein.


     Expected Outcomes/Goals


      Expected Outcomes/Goals                    1. Pt to meet 100% of


                                                 estimated nutritinoal eneds on


                                                 tube feeding with tolerance.

## 2017-10-08 NOTE — PROGRESS NOTES
DATE:  10/07/2017



SUBJECTIVE:  The patient is awake.  The patient is on tube feeds.  The patient

underwent a colonoscopy yesterday.  The patient is on IV fluids.



OBJECTIVE:

VITAL SIGNS:  Temperature is 97.6, pulse is 60, blood pressure 123/74,

respirations 19, O2 sat is 99% on room air.

CARDIOVASCULAR:  S1 and S2.

RESPIRATORY:  Clear.

GASTROINTESTINAL:  Soft.  Positive bowel sounds.



LABORATORY DATA:  No labs for today.  Radiology:  No new results.



Operative report on colonoscopy done on 10/06/2017 shows long dilated colon with

suboptimal preparation, exam to cecum, an 8 mm sessile distal rectal polyp,

possibly overlying ulcerations, status post biopsy and small hemorrhoids.



ASSESSMENT AND PLAN:

1.  Status post colonoscopy.

2.  Rectal polyp.

3.  Hemorrhoids.

4.  Rectal bleed (resolved).

5.  Encephalopathy.

6.  Dyslipidemia.

7.  Hypertension.

9.  Cerebrovascular accident/transient ischemic attack.

10.  Peptic ulcer disease/gastroesophageal reflux disease.

11.  Dementia.

12.  Dysphagia, status post gastrostomy tube.



PLAN:  Case management for discharge planning.  Further ____ consults.





DD: 10/07/2017 12:09

DT: 10/07/2017 23:22

JOB# [id number]  [id number]

## 2017-10-08 NOTE — PROGRESS NOTES
DATE:  10/06/2017



SUBJECTIVE:  The patient is awake.  The patient is on IV fluids.  The patient is

scheduled for a colonoscopy today.



OBJECTIVE:

VITAL SIGNS:  Temperature 98.8, pulse 66, blood pressure 123/67, respiratory

rate 18, O2 sat is ____ on room air.

CARDIOVASCULAR:  S1 and S2.

RESPIRATORY:  Clear.

GASTROINTESTINAL:  Soft, positive bowel sounds.



LABORATORY DATA:  Hematology:  WBC of 5.5, hemoglobin 12.2, hematocrit 36.2,

platelet count unknown.  Chemistries:  Sodium 132, potassium 3.2, chloride 107,

bicarbonate 24, anion gap 7.9, BUN 10, creatinine 0.3, GFR is more than 60,

glucose is 91, calcium 8.1, ____.  Microbiology:  MRSA screen from 10/04/2017

negative.  Radiology:  No new results.



ASSESSMENT:

1.  Fecal impaction.

2. Rectal bleed.

3.  Encephalopathy.

4.  Dyslipidemia.

5.  Hypertension.

6.  Cerebrovascular accident/transient ischemic attack.

7.  Peptic ulcer disease/gastroesophageal reflux disease.

8.  Dementia.

9.  Dysphagia, status post gastrostomy tube.

10.  Hypokalemia.

11.  Hypocalcemia.



PLAN:

1.  Continue current medications.

2.  Obtain labs in a.m.

3.  The patient is scheduled for colonoscopy today.  ____.





DD: 10/07/2017 12:06

DT: 10/07/2017 23:07

JOB# [id number]  [id number]

## 2017-10-10 NOTE — PATHOLOGY REPORT
Collection date:  10/06/2017

Surgeon:  Dr. SANTA Hill

Specimen Description:

Rectal polyp.

 

Gross Description:  Received in formalin are three tan soft tissue fragments

ranging from 0.1 to 0.2 cm in greatest dimension.  Totally submitted in one

cassette.

 

Microscopic Description:  The histologic sections show ulcerated colo-rectal

mucosa with acute and chronic inflammation consisting of increased numbers of

neutrophils and lymphocytes admixed with plasma cells.  The mucosal surface

shows areas of ulceration associated with this inflammation.  There is no

evidence for hyperplastic nor adenomatous glandular changes.

  

Diagnosis:   

Ulcerated colo-rectal mucosa showing acute and chronic inflammation, consistent

with an inflammatory polyp (rectal polyp).









DD: 10/10/2017 13:58

DT: 10/10/2017 14:07

[facility]# [id number]  [id number]

DARIA

## 2019-06-22 ENCOUNTER — HOSPITAL ENCOUNTER (EMERGENCY)
Dept: HOSPITAL 4 - SED | Age: 64
Discharge: HOME | End: 2019-06-22
Payer: COMMERCIAL

## 2019-06-22 VITALS — WEIGHT: 210 LBS | HEIGHT: 65 IN | BODY MASS INDEX: 34.99 KG/M2

## 2019-06-22 VITALS — SYSTOLIC BLOOD PRESSURE: 116 MMHG

## 2019-06-22 VITALS — SYSTOLIC BLOOD PRESSURE: 108 MMHG

## 2019-06-22 DIAGNOSIS — Z43.1: Primary | ICD-10-CM

## 2019-06-22 DIAGNOSIS — F03.90: ICD-10-CM
